# Patient Record
Sex: MALE | URBAN - METROPOLITAN AREA
[De-identification: names, ages, dates, MRNs, and addresses within clinical notes are randomized per-mention and may not be internally consistent; named-entity substitution may affect disease eponyms.]

---

## 2021-12-02 ENCOUNTER — APPOINTMENT (RX ONLY)
Dept: URBAN - METROPOLITAN AREA CLINIC 151 | Facility: CLINIC | Age: 41
Setting detail: DERMATOLOGY
End: 2021-12-02

## 2021-12-02 DIAGNOSIS — L60.1 ONYCHOLYSIS: ICD-10-CM

## 2021-12-02 DIAGNOSIS — L40.0 PSORIASIS VULGARIS: ICD-10-CM

## 2021-12-02 PROCEDURE — 99203 OFFICE O/P NEW LOW 30 MIN: CPT

## 2021-12-02 PROCEDURE — ? DIAGNOSIS COMMENT

## 2021-12-02 PROCEDURE — ? COUNSELING

## 2021-12-02 PROCEDURE — ? PRESCRIPTION

## 2021-12-02 RX ORDER — CLOBETASOL PROPIONATE 0.5 MG/G
OINTMENT TOPICAL
Qty: 30 | Refills: 2 | Status: ERX | COMMUNITY
Start: 2021-12-02

## 2021-12-02 RX ADMIN — CLOBETASOL PROPIONATE: 0.5 OINTMENT TOPICAL at 00:00

## 2021-12-02 ASSESSMENT — LOCATION DETAILED DESCRIPTION DERM: LOCATION DETAILED: LEFT THUMBNAIL

## 2021-12-02 ASSESSMENT — LOCATION ZONE DERM: LOCATION ZONE: FINGERNAIL

## 2021-12-02 ASSESSMENT — LOCATION SIMPLE DESCRIPTION DERM: LOCATION SIMPLE: LEFT THUMB

## 2021-12-02 NOTE — HPI: OTHER
Condition:: Psoriasis
Please Describe Your Condition:: \\n\\nPt is currently on Otezla 30mg BID which active areas on b/l arms, hands, legs, shins, and kneecaps. States the medications was working wonderfully when initiated, however, since then, he has not been getting any improvements and his PSO is consistently present. Would like to discuss other possible treatment options.

## 2021-12-02 NOTE — PROCEDURE: DIAGNOSIS COMMENT
Comment: Inadequately controlled psoriasis while on Otezla. Discussed the risks and benefits of Otelza usage. We discussed the possibility of joining the [facility] PACI clinical trial, and possibility initiating systemic medications such as: Skyrizi.
Render Risk Assessment In Note?: yes
Detail Level: Zone